# Patient Record
Sex: FEMALE | Employment: UNEMPLOYED | URBAN - NONMETROPOLITAN AREA
[De-identification: names, ages, dates, MRNs, and addresses within clinical notes are randomized per-mention and may not be internally consistent; named-entity substitution may affect disease eponyms.]

---

## 2022-08-25 ENCOUNTER — HOSPITAL ENCOUNTER (INPATIENT)
Age: 62
LOS: 2 days | Discharge: HOME | DRG: 287 | End: 2022-08-27
Attending: PEDIATRICS | Admitting: PEDIATRICS
Payer: COMMERCIAL

## 2022-08-25 PROBLEM — I21.4 NSTEMI (NON-ST ELEVATED MYOCARDIAL INFARCTION) (HCC): Status: ACTIVE | Noted: 2022-08-25

## 2022-08-25 PROCEDURE — 2140000000 HC CCU INTERMEDIATE R&B

## 2022-08-25 PROCEDURE — 99223 1ST HOSP IP/OBS HIGH 75: CPT | Performed by: PHYSICIAN ASSISTANT

## 2022-08-25 RX ORDER — PANTOPRAZOLE SODIUM 20 MG/1
20 TABLET, DELAYED RELEASE ORAL EVERY OTHER DAY
COMMUNITY

## 2022-08-25 RX ORDER — ALPRAZOLAM 0.5 MG/1
0.5 TABLET ORAL 3 TIMES DAILY
COMMUNITY

## 2022-08-25 RX ORDER — ESCITALOPRAM OXALATE 20 MG/1
20 TABLET ORAL DAILY
COMMUNITY

## 2022-08-25 NOTE — PROGRESS NOTES
Transfer  Report from [facility]  Referring Physician Dr Jw Shields  Accepting Physician Dr Sandoval Cordova  Patient Condition: 59 yo inpatient at [facility] with history of anxiety and GERD comes with complaints of chest pain that radiated to her neck, and SOB. The pain has resolved but today her Tropinin went from 0.09 to 1.070. She had EKG changes showing sinus bradycardia, incomplete RBBB, t-wave inversion in leads 2,3 and AVS and V4-V6. Labs creat 0.7, Hgb 12, all other labs within normal limits. Patient was given ASA, Lovenox, last dose at 1000 today, 60 mg. Vitals 97.7 60 18 123/66 97% on RA.     Accepted on behalf of Dr Sandoval Cordova to 7N47 with diagnosis of NSTEMI

## 2022-08-26 PROBLEM — R77.8 ELEVATED TROPONIN: Status: ACTIVE | Noted: 2022-08-26

## 2022-08-26 LAB
ANION GAP SERPL CALCULATED.3IONS-SCNC: 11 MEQ/L (ref 8–16)
ANION GAP SERPL CALCULATED.3IONS-SCNC: 13 MEQ/L (ref 8–16)
APTT: 34.7 SECONDS (ref 22–38)
APTT: 60.1 SECONDS (ref 22–38)
BUN BLDV-MCNC: 10 MG/DL (ref 7–22)
BUN BLDV-MCNC: 11 MG/DL (ref 7–22)
CALCIUM SERPL-MCNC: 9.3 MG/DL (ref 8.5–10.5)
CALCIUM SERPL-MCNC: 9.4 MG/DL (ref 8.5–10.5)
CHLORIDE BLD-SCNC: 109 MEQ/L (ref 98–111)
CHLORIDE BLD-SCNC: 110 MEQ/L (ref 98–111)
CHOLESTEROL, TOTAL: 128 MG/DL (ref 100–199)
CO2: 22 MEQ/L (ref 23–33)
CO2: 23 MEQ/L (ref 23–33)
CREAT SERPL-MCNC: 0.6 MG/DL (ref 0.4–1.2)
CREAT SERPL-MCNC: 0.6 MG/DL (ref 0.4–1.2)
EKG ATRIAL RATE: 58 BPM
EKG P AXIS: 49 DEGREES
EKG P-R INTERVAL: 158 MS
EKG Q-T INTERVAL: 466 MS
EKG QRS DURATION: 94 MS
EKG QTC CALCULATION (BAZETT): 457 MS
EKG R AXIS: -81 DEGREES
EKG T AXIS: -77 DEGREES
EKG VENTRICULAR RATE: 58 BPM
ERYTHROCYTE [DISTWIDTH] IN BLOOD BY AUTOMATED COUNT: 13 % (ref 11.5–14.5)
ERYTHROCYTE [DISTWIDTH] IN BLOOD BY AUTOMATED COUNT: 13.2 % (ref 11.5–14.5)
ERYTHROCYTE [DISTWIDTH] IN BLOOD BY AUTOMATED COUNT: 44.8 FL (ref 35–45)
ERYTHROCYTE [DISTWIDTH] IN BLOOD BY AUTOMATED COUNT: 46.2 FL (ref 35–45)
GFR SERPL CREATININE-BSD FRML MDRD: > 90 ML/MIN/1.73M2
GFR SERPL CREATININE-BSD FRML MDRD: > 90 ML/MIN/1.73M2
GLUCOSE BLD-MCNC: 95 MG/DL (ref 70–108)
GLUCOSE BLD-MCNC: 96 MG/DL (ref 70–108)
HCT VFR BLD CALC: 37 % (ref 37–47)
HCT VFR BLD CALC: 37.4 % (ref 37–47)
HDLC SERPL-MCNC: 56 MG/DL
HEMOGLOBIN: 12.2 GM/DL (ref 12–16)
HEMOGLOBIN: 12.6 GM/DL (ref 12–16)
INR BLD: 1.05 (ref 0.85–1.13)
LDL CHOLESTEROL CALCULATED: 59 MG/DL
LV EF: 58 %
LVEF MODALITY: NORMAL
MCH RBC QN AUTO: 31.4 PG (ref 26–33)
MCH RBC QN AUTO: 31.7 PG (ref 26–33)
MCHC RBC AUTO-ENTMCNC: 33 GM/DL (ref 32.2–35.5)
MCHC RBC AUTO-ENTMCNC: 33.7 GM/DL (ref 32.2–35.5)
MCV RBC AUTO: 94 FL (ref 81–99)
MCV RBC AUTO: 95.1 FL (ref 81–99)
PLATELET # BLD: 250 THOU/MM3 (ref 130–400)
PLATELET # BLD: 255 THOU/MM3 (ref 130–400)
PMV BLD AUTO: 10.6 FL (ref 9.4–12.4)
PMV BLD AUTO: 10.7 FL (ref 9.4–12.4)
POTASSIUM REFLEX MAGNESIUM: 5.1 MEQ/L (ref 3.5–5.2)
POTASSIUM SERPL-SCNC: 3.9 MEQ/L (ref 3.5–5.2)
RBC # BLD: 3.89 MILL/MM3 (ref 4.2–5.4)
RBC # BLD: 3.98 MILL/MM3 (ref 4.2–5.4)
SODIUM BLD-SCNC: 143 MEQ/L (ref 135–145)
SODIUM BLD-SCNC: 145 MEQ/L (ref 135–145)
TRIGL SERPL-MCNC: 63 MG/DL (ref 0–199)
TROPONIN T: 0.03 NG/ML
TROPONIN T: < 0.01 NG/ML
WBC # BLD: 6.5 THOU/MM3 (ref 4.8–10.8)
WBC # BLD: 7.9 THOU/MM3 (ref 4.8–10.8)

## 2022-08-26 PROCEDURE — 93306 TTE W/DOPPLER COMPLETE: CPT

## 2022-08-26 PROCEDURE — 93010 ELECTROCARDIOGRAM REPORT: CPT | Performed by: INTERNAL MEDICINE

## 2022-08-26 PROCEDURE — 6360000002 HC RX W HCPCS

## 2022-08-26 PROCEDURE — 6370000000 HC RX 637 (ALT 250 FOR IP): Performed by: PHYSICIAN ASSISTANT

## 2022-08-26 PROCEDURE — 85730 THROMBOPLASTIN TIME PARTIAL: CPT

## 2022-08-26 PROCEDURE — 99223 1ST HOSP IP/OBS HIGH 75: CPT | Performed by: INTERNAL MEDICINE

## 2022-08-26 PROCEDURE — 2140000000 HC CCU INTERMEDIATE R&B

## 2022-08-26 PROCEDURE — 99232 SBSQ HOSP IP/OBS MODERATE 35: CPT | Performed by: HOSPITALIST

## 2022-08-26 PROCEDURE — B2111ZZ FLUOROSCOPY OF MULTIPLE CORONARY ARTERIES USING LOW OSMOLAR CONTRAST: ICD-10-PCS | Performed by: INTERNAL MEDICINE

## 2022-08-26 PROCEDURE — 2580000003 HC RX 258: Performed by: PHYSICIAN ASSISTANT

## 2022-08-26 PROCEDURE — 85610 PROTHROMBIN TIME: CPT

## 2022-08-26 PROCEDURE — 93458 L HRT ARTERY/VENTRICLE ANGIO: CPT

## 2022-08-26 PROCEDURE — 6360000004 HC RX CONTRAST MEDICATION: Performed by: INTERNAL MEDICINE

## 2022-08-26 PROCEDURE — 84484 ASSAY OF TROPONIN QUANT: CPT

## 2022-08-26 PROCEDURE — 80061 LIPID PANEL: CPT

## 2022-08-26 PROCEDURE — 2500000003 HC RX 250 WO HCPCS

## 2022-08-26 PROCEDURE — 2580000003 HC RX 258: Performed by: INTERNAL MEDICINE

## 2022-08-26 PROCEDURE — 6360000002 HC RX W HCPCS: Performed by: PHYSICIAN ASSISTANT

## 2022-08-26 PROCEDURE — 4A023N7 MEASUREMENT OF CARDIAC SAMPLING AND PRESSURE, LEFT HEART, PERCUTANEOUS APPROACH: ICD-10-PCS | Performed by: INTERNAL MEDICINE

## 2022-08-26 PROCEDURE — 93005 ELECTROCARDIOGRAM TRACING: CPT | Performed by: INTERNAL MEDICINE

## 2022-08-26 PROCEDURE — 85027 COMPLETE CBC AUTOMATED: CPT

## 2022-08-26 PROCEDURE — 93458 L HRT ARTERY/VENTRICLE ANGIO: CPT | Performed by: INTERNAL MEDICINE

## 2022-08-26 PROCEDURE — 80048 BASIC METABOLIC PNL TOTAL CA: CPT

## 2022-08-26 PROCEDURE — 36415 COLL VENOUS BLD VENIPUNCTURE: CPT

## 2022-08-26 RX ORDER — MAGNESIUM SULFATE IN WATER 40 MG/ML
2000 INJECTION, SOLUTION INTRAVENOUS PRN
Status: DISCONTINUED | OUTPATIENT
Start: 2022-08-26 | End: 2022-08-27 | Stop reason: HOSPADM

## 2022-08-26 RX ORDER — HEPARIN SODIUM 10000 [USP'U]/100ML
5-30 INJECTION, SOLUTION INTRAVENOUS CONTINUOUS
Status: DISCONTINUED | OUTPATIENT
Start: 2022-08-26 | End: 2022-08-26

## 2022-08-26 RX ORDER — SODIUM CHLORIDE 9 MG/ML
INJECTION, SOLUTION INTRAVENOUS PRN
Status: DISCONTINUED | OUTPATIENT
Start: 2022-08-26 | End: 2022-08-27 | Stop reason: HOSPADM

## 2022-08-26 RX ORDER — ONDANSETRON 4 MG/1
4 TABLET, ORALLY DISINTEGRATING ORAL EVERY 8 HOURS PRN
Status: DISCONTINUED | OUTPATIENT
Start: 2022-08-26 | End: 2022-08-27 | Stop reason: HOSPADM

## 2022-08-26 RX ORDER — SODIUM CHLORIDE 0.9 % (FLUSH) 0.9 %
5-40 SYRINGE (ML) INJECTION PRN
Status: DISCONTINUED | OUTPATIENT
Start: 2022-08-26 | End: 2022-08-26 | Stop reason: SDUPTHER

## 2022-08-26 RX ORDER — POTASSIUM CHLORIDE 7.45 MG/ML
10 INJECTION INTRAVENOUS PRN
Status: DISCONTINUED | OUTPATIENT
Start: 2022-08-26 | End: 2022-08-27 | Stop reason: HOSPADM

## 2022-08-26 RX ORDER — PANTOPRAZOLE SODIUM 40 MG/1
40 TABLET, DELAYED RELEASE ORAL
Status: DISCONTINUED | OUTPATIENT
Start: 2022-08-26 | End: 2022-08-27 | Stop reason: HOSPADM

## 2022-08-26 RX ORDER — ACETAMINOPHEN 325 MG/1
650 TABLET ORAL EVERY 4 HOURS PRN
Status: DISCONTINUED | OUTPATIENT
Start: 2022-08-26 | End: 2022-08-27 | Stop reason: HOSPADM

## 2022-08-26 RX ORDER — ESCITALOPRAM OXALATE 20 MG/1
20 TABLET ORAL DAILY
Status: DISCONTINUED | OUTPATIENT
Start: 2022-08-26 | End: 2022-08-27 | Stop reason: HOSPADM

## 2022-08-26 RX ORDER — ALPRAZOLAM 0.5 MG/1
0.5 TABLET ORAL 3 TIMES DAILY PRN
Status: DISCONTINUED | OUTPATIENT
Start: 2022-08-26 | End: 2022-08-27 | Stop reason: HOSPADM

## 2022-08-26 RX ORDER — MORPHINE SULFATE 2 MG/ML
2 INJECTION, SOLUTION INTRAMUSCULAR; INTRAVENOUS EVERY 4 HOURS PRN
Status: DISCONTINUED | OUTPATIENT
Start: 2022-08-26 | End: 2022-08-27 | Stop reason: HOSPADM

## 2022-08-26 RX ORDER — HEPARIN SODIUM 1000 [USP'U]/ML
30 INJECTION, SOLUTION INTRAVENOUS; SUBCUTANEOUS PRN
Status: DISCONTINUED | OUTPATIENT
Start: 2022-08-26 | End: 2022-08-26 | Stop reason: ALTCHOICE

## 2022-08-26 RX ORDER — POLYETHYLENE GLYCOL 3350 17 G/17G
17 POWDER, FOR SOLUTION ORAL DAILY PRN
Status: DISCONTINUED | OUTPATIENT
Start: 2022-08-26 | End: 2022-08-27 | Stop reason: HOSPADM

## 2022-08-26 RX ORDER — SODIUM CHLORIDE 0.9 % (FLUSH) 0.9 %
5-40 SYRINGE (ML) INJECTION EVERY 12 HOURS SCHEDULED
Status: DISCONTINUED | OUTPATIENT
Start: 2022-08-26 | End: 2022-08-26 | Stop reason: SDUPTHER

## 2022-08-26 RX ORDER — ACETAMINOPHEN 325 MG/1
650 TABLET ORAL EVERY 6 HOURS PRN
Status: DISCONTINUED | OUTPATIENT
Start: 2022-08-26 | End: 2022-08-26 | Stop reason: SDUPTHER

## 2022-08-26 RX ORDER — ONDANSETRON 2 MG/ML
4 INJECTION INTRAMUSCULAR; INTRAVENOUS EVERY 6 HOURS PRN
Status: DISCONTINUED | OUTPATIENT
Start: 2022-08-26 | End: 2022-08-27 | Stop reason: HOSPADM

## 2022-08-26 RX ORDER — ASPIRIN 81 MG/1
81 TABLET, CHEWABLE ORAL DAILY
Status: DISCONTINUED | OUTPATIENT
Start: 2022-08-27 | End: 2022-08-27 | Stop reason: HOSPADM

## 2022-08-26 RX ORDER — HEPARIN SODIUM 1000 [USP'U]/ML
60 INJECTION, SOLUTION INTRAVENOUS; SUBCUTANEOUS PRN
Status: DISCONTINUED | OUTPATIENT
Start: 2022-08-26 | End: 2022-08-26 | Stop reason: ALTCHOICE

## 2022-08-26 RX ORDER — SODIUM CHLORIDE 9 MG/ML
INJECTION, SOLUTION INTRAVENOUS CONTINUOUS
Status: DISCONTINUED | OUTPATIENT
Start: 2022-08-26 | End: 2022-08-27 | Stop reason: HOSPADM

## 2022-08-26 RX ORDER — ATORVASTATIN CALCIUM 80 MG/1
80 TABLET, FILM COATED ORAL NIGHTLY
Status: DISCONTINUED | OUTPATIENT
Start: 2022-08-26 | End: 2022-08-27 | Stop reason: HOSPADM

## 2022-08-26 RX ORDER — HYDRALAZINE HYDROCHLORIDE 20 MG/ML
10 INJECTION INTRAMUSCULAR; INTRAVENOUS EVERY 6 HOURS PRN
Status: DISCONTINUED | OUTPATIENT
Start: 2022-08-26 | End: 2022-08-27 | Stop reason: HOSPADM

## 2022-08-26 RX ORDER — POTASSIUM CHLORIDE 20 MEQ/1
40 TABLET, EXTENDED RELEASE ORAL PRN
Status: DISCONTINUED | OUTPATIENT
Start: 2022-08-26 | End: 2022-08-27 | Stop reason: HOSPADM

## 2022-08-26 RX ORDER — ACETAMINOPHEN 650 MG/1
650 SUPPOSITORY RECTAL EVERY 6 HOURS PRN
Status: DISCONTINUED | OUTPATIENT
Start: 2022-08-26 | End: 2022-08-27 | Stop reason: HOSPADM

## 2022-08-26 RX ORDER — MORPHINE SULFATE 4 MG/ML
4 INJECTION, SOLUTION INTRAMUSCULAR; INTRAVENOUS EVERY 4 HOURS PRN
Status: DISCONTINUED | OUTPATIENT
Start: 2022-08-26 | End: 2022-08-27 | Stop reason: HOSPADM

## 2022-08-26 RX ORDER — SODIUM CHLORIDE 0.9 % (FLUSH) 0.9 %
5-40 SYRINGE (ML) INJECTION EVERY 12 HOURS SCHEDULED
Status: DISCONTINUED | OUTPATIENT
Start: 2022-08-26 | End: 2022-08-27 | Stop reason: HOSPADM

## 2022-08-26 RX ORDER — SODIUM CHLORIDE 0.9 % (FLUSH) 0.9 %
5-40 SYRINGE (ML) INJECTION PRN
Status: DISCONTINUED | OUTPATIENT
Start: 2022-08-26 | End: 2022-08-27 | Stop reason: HOSPADM

## 2022-08-26 RX ADMIN — ALPRAZOLAM 0.5 MG: 0.5 TABLET ORAL at 10:34

## 2022-08-26 RX ADMIN — IOPAMIDOL 70 ML: 755 INJECTION, SOLUTION INTRAVENOUS at 16:31

## 2022-08-26 RX ADMIN — PANTOPRAZOLE SODIUM 40 MG: 40 TABLET, DELAYED RELEASE ORAL at 07:48

## 2022-08-26 RX ADMIN — HEPARIN SODIUM AND DEXTROSE 12 UNITS/KG/HR: 10000; 5 INJECTION INTRAVENOUS at 05:24

## 2022-08-26 RX ADMIN — ACETAMINOPHEN 650 MG: 325 TABLET ORAL at 08:52

## 2022-08-26 RX ADMIN — ESCITALOPRAM 20 MG: 20 TABLET, FILM COATED ORAL at 10:01

## 2022-08-26 RX ADMIN — SODIUM CHLORIDE: 9 INJECTION, SOLUTION INTRAVENOUS at 05:22

## 2022-08-26 RX ADMIN — SODIUM CHLORIDE, PRESERVATIVE FREE 10 ML: 5 INJECTION INTRAVENOUS at 21:22

## 2022-08-26 RX ADMIN — SODIUM CHLORIDE: 9 INJECTION, SOLUTION INTRAVENOUS at 16:45

## 2022-08-26 ASSESSMENT — ENCOUNTER SYMPTOMS
NAUSEA: 1
SHORTNESS OF BREATH: 1
ALLERGIC/IMMUNOLOGIC NEGATIVE: 1
BACK PAIN: 1
EYES NEGATIVE: 1
VOMITING: 0

## 2022-08-26 ASSESSMENT — PAIN SCALES - GENERAL
PAINLEVEL_OUTOF10: 4
PAINLEVEL_OUTOF10: 0

## 2022-08-26 ASSESSMENT — PAIN DESCRIPTION - LOCATION: LOCATION: HEAD

## 2022-08-26 NOTE — PROGRESS NOTES
Hospitalist Progress Note    Patient:  Ratna Hanna      Unit/Bed:3B-36/036-A    YOB: 1960    MRN: [id number]       Acct: [de-identified]     PCP: Nai Jules    Date of Admission: 8/25/2022    Assessment/Plan:    NSTEMI: EKG changes and elevated troponin. Appreciate cardiology input. Continue with aspirin, statin. Patient is on heparin drip and plan for left heart cath today. 2D echo reviewed reveals normal EF and LV wall motion. Depression: Continue with Lexapro  GERD: Continue Protonix 40 mg daily      Subjective (past 24 hours):   Patient seen and examined at bedside. Denies any chest pain, nausea or vomiting, dizziness at this time. Patient is tearful willing to proceed with cardiac catheterization. No acute overnight events.       Medications:  Reviewed    Infusion Medications    sodium chloride      sodium chloride 50 mL/hr at 08/26/22 0522    heparin (PORCINE) Infusion 12 Units/kg/hr (08/26/22 0524)    sodium chloride       Scheduled Medications    escitalopram  20 mg Oral Daily    pantoprazole  40 mg Oral QAM AC    sodium chloride flush  5-40 mL IntraVENous 2 times per day    [START ON 8/27/2022] aspirin  81 mg Oral Daily    atorvastatin  80 mg Oral Nightly    sodium chloride flush  5-40 mL IntraVENous 2 times per day     PRN Meds: ALPRAZolam, sodium chloride flush, sodium chloride, ondansetron **OR** ondansetron, acetaminophen **OR** acetaminophen, polyethylene glycol, perflutren lipid microspheres, potassium chloride **OR** potassium alternative oral replacement **OR** potassium chloride, magnesium sulfate, morphine **OR** morphine, heparin (porcine), heparin (porcine), sodium chloride flush, sodium chloride    No intake or output data in the 24 hours ending 08/26/22 1340    Diet:  Diet NPO Exceptions are: Sips of Water with Meds    Exam:  /69   Pulse 54   Temp 97.8 °F (36.6 °C) (Oral)   Resp 18   Ht 5' 2\" (1.575 m)   Wt 138 lb 12.8 oz (63 kg)   SpO2 96%   BMI 25.39 kg/m²     General appearance: No apparent distress, appears stated age and cooperative. Respiratory:  Normal respiratory effort. Clear to auscultation, bilaterally without Rales/Wheezes/Rhonchi. Cardiovascular: Regular rate and rhythm with normal S1/S2 without murmurs, rubs or gallops. Abdomen: Soft, non-tender, non-distended with normal bowel sounds. Extremities: no pedal edema  Skin:  no rashes    Labs:   Recent Labs     08/26/22  0644 08/26/22  1052   WBC 6.5 7.9   HGB 12.2 12.6   HCT 37.0 37.4    255     Recent Labs     08/26/22  1052      K 3.9      CO2 23   BUN 10   CREATININE 0.6   CALCIUM 9.3     No results for input(s): AST, ALT, BILIDIR, BILITOT, ALKPHOS in the last 72 hours. Recent Labs     08/26/22  0218   INR 1.05     No results for input(s): Re Horn in the last 72 hours. No results for input(s): PROCAL in the last 72 hours. Microbiology:      Urinalysis:    No results found for: Swanson Joel, BACTERIA, RBCUA, Anamaria Kipper, [address]    Radiology:  Echocardiogram 2D/ M-Mode/ Colorflow/ Do    Result Date: 8/26/2022  Transthoracic Echocardiography Report (TTE)  Demographics   Patient Name    [place] Gender               Female   MR #            [id number]     Race                 Other                                 Ethnicity             or    Account #       [de-identified]     Room Number          0036   Accession       [id number]    Date of Study        08/26/2022  Number   Date of Birth   1960    Referring Physician  Hardeep Moya PA-C   Age             58 year(s)    Katia Whittington MD                                Physician  Procedure Type of Study   TTE procedure:ECHOCARDIOGRAM COMPLETE 2D W DOPPLER W COLOR.   Procedure Date Date: 08/26/2022 Start: 08:24 AM Study Location: Bedside Technical Quality: Adequate visualization Indications:NSTEMI. Additional Medical History:Smoker, GERD Patient Status: Routine Height: 62.2 inches Weight: 138.89 pounds BSA: 1.64 m^2 BMI: 25.24 kg/m^2 BP: 129/72 mmHg  Conclusions   Summary  Normal left ventricle size and systolic function. Ejection fraction was  estimated at 55-60%. There were no regional left ventricular wall motion  abnormalities and wall thickness was within normal limits. Signature   ----------------------------------------------------------------  Electronically signed by Manolo Uriarte MD (Interpreting  physician) on 08/26/2022 at 12:23 PM  ----------------------------------------------------------------   Findings   Mitral Valve  The mitral valve structure was normal with normal leaflet separation. DOPPLER: The transmitral velocity was within the normal range with no  evidence for mitral stenosis. There was evidence of Trace mitral  regurgitation. Aortic Valve  The aortic valve was trileaflet with normal thickness and cuspal  separation. DOPPLER: Transaortic velocity was within the normal range with  no evidence of aortic stenosis. There was no evidence of aortic  regurgitation. Tricuspid Valve  The tricuspid valve structure was normal with normal leaflet separation. DOPPLER: There was no evidence of tricuspid stenosis. There was evidence  of Trace tricuspid regurgitation. Pulmonic Valve  The pulmonic valve leaflets exhibited normal thickness, no calcification,  and normal cuspal separation. DOPPLER: The transpulmonic velocity was  within the normal range with no evidence for regurgitation. Left Atrium  Left atrial size was normal.   Left Ventricle  Normal left ventricle size and systolic function. Ejection fraction was  estimated at 55-60%. There were no regional left ventricular wall motion  abnormalities and wall thickness was within normal limits.    Right Atrium  Right atrial size was normal.   Right Ventricle  The right ventricular size was normal with normal systolic function and  wall thickness. Pericardial Effusion  The pericardium was normal in appearance with no evidence of a pericardial  effusion. Pleural Effusion  No evidence of pleural effusion. Aorta / Great Vessels  -Aortic root dimension within normal limits.  -The Pulmonary artery is within normal limits. -IVC size is within normal limits with normal respiratory phasic changes.   M-Mode/2D Measurements & Calculations   LV Diastolic   LV Systolic Dimension:    AV Cusp Separation: 1.9 cmLA  Dimension: 4.7 3.4 cm                    Dimension: 3.4 cmAO Root  cm             LV Volume Diastolic: 74.9 Dimension: 2.7 cmLA Area: 14.1  LV FS:27.7 %   ml                        cm^2  LV PW          LV Volume Systolic: 29.4  Diastolic: 0.9 ml  cm             LV EDV/LV EDV Index: 74.8  Septum         ml/46 m^2LV ESV/LV ESV    RV Diastolic Dimension: 2.5 cm  Diastolic: 0.9 Index: 15.0 ml/22 m^2  cm             EF Calculated: 52.8 %     LA/Aorta: 1.26                                           Ascending Aorta: 2.9 cm                 LV Length: 7.5 cm         LA volume/Index: 32.1 ml /20m^2   LV Area  Diastolic: 26  cm^2  LV Area  Systolic: 82.8  cm^2  Doppler Measurements & Calculations   MV Peak E-Wave: 72.8 AV Peak Velocity: 126 LVOT Peak Velocity: 99.9 cm/s  cm/s                 cm/s                  LVOT Mean Velocity: 66.5 cm/s  MV Peak A-Wave: 58.7 AV Peak Gradient:     LVOT Peak Gradient: 4 mmHgLVOT  cm/s                 6.35 mmHg             Mean Gradient: 2 mmHg  MV E/A Ratio: 1.24   AV Mean Velocity: 90  MV Peak Gradient:    cm/s                  TV Peak E-Wave: 55.7 cm/s  2.12 mmHg            AV Mean Gradient: 4   TV Peak A-Wave: 42.4 cm/s                       mmHg  MV Deceleration      AV VTI: 30 cm         TV Peak Gradient: 1.24 mmHg  Time: 239 msec                             TR Velocity:226 cm/s  MV P1/2t: 70

## 2022-08-26 NOTE — BRIEF OP NOTE
[address] [address]  Sedation/Analgesia Post Sedation Record        Pt Name: Yolis Carpenter  MRN: [id number]  YOB: 1960  Procedure Performed By: Foreign Henry MD MD, [address]  Primary Care Physician: Loco Dyson    POST-PROCEDURE    Sedation/Anesthesia:  Local Anesthesia and IV Conscious Sedation with continuous O2 monitoring    Estimated Blood Loss: 10 cc     Specimens Removed:  [x]None []Other:      Disposition of Specimen:  []Pathology []Other        Complications:   [x]None Immediate []Other:       Procedure performed: Left heart cath    Post Procedure Diagnosis/Findings:  Patent coronaries  Recommendations:    Medical treatment  Routine post-cath care.                Foreign Henry MD MD, [address]  Electronically signed 8/26/2022 at 4:34 PM

## 2022-08-26 NOTE — PROCEDURES
[address] [id number]                            CARDIAC CATHETERIZATION    PATIENT NAME: Ollie Hathaway                      :        1960  MED REC NO:   [id number]                           ROOM:       0036  ACCOUNT NO:   [de-identified]                           ADMIT DATE: 2022  PROVIDER:     Christine Khan MD    DATE OF PROCEDURE:  2022    PROCEDURE PERFORMED:  Coronary angiogram.    INDICATION FOR STUDY:  Elevated troponins. DESCRIPTION OF PROCEDURE:  After written informed consent was obtained,  the patient was brought to the cardiac catheterization laboratory in a  fasting, nonsedated state. Preprocedure timeout was performed. 2%  lidocaine was used to anesthetize the subcutaneous tissue overlying the  right radial artery. Once the sheath was in place, a radial cocktail  was given. EQUIPMENTS USED:  Standard 5-Kazakh JR4, JL3.5 diagnostic catheters. ESTIMATED BLOOD LOSS:  Less than 20 mL. CORONARY ANGIOGRAM:  1. Right coronary artery is dominant vessel. There is no significant  disease in the proximal, mid and distal portions of the vessel. Distally, there is a large-sized PDA and PL branches, both of which are  patent. 2.  Left main is patent, gives rise to LAD and left circumflex arteries. 3.  Left circumflex artery is patent with no significant disease in the  proximal, mid and distal portions of the vessel. 4. LAD is patent in the proximal portion, mid and distal portions have  mild luminal irregularities. LVEDP was measured to be 3 mmHg. There is no significant gradient  across the aortic valve. LV systolic function was estimated at 60%. Hemostasis of the right radial artery was achieved with a Vasc Band  device. There were no immediate complaints and she was transferred to  her room in stable condition. SUMMARY:  1. Patent coronary arteries.   2.  LVEDP was measured to be 3 mmHg.  3.  LV systolic function was estimated at 60%. RECOMMENDATIONS:  1. Optimize medical therapy. 2.  Smoking cessation strongly advised. 3.  IV fluids. 4.  Monitor radial artery access site. All procedure details and management plans were discussed in detail with  the patient and family members, and they were in agreement with the  plan.         Mary Ann Brandt MD    D: 08/26/2022 16:49:33       T: 08/26/2022 17:44:11     JUAN [email or web address]  Job#: [id number]     Doc#: [id number]    CC:

## 2022-08-26 NOTE — CARE COORDINATION
8/26/22, 8:10 AM EDT  DISCHARGE PLANNING EVALUATION:    Mary Siu       Admitted: 8/25/2022/ 2120   Hospital day: 1   Location: [address] [place] [address]-A Reason for admit: NSTEMI (non-ST elevated myocardial infarction) ([facility] [address] [I21.4]   PMH:  has a past medical history of GERD (gastroesophageal reflux disease), History of blood transfusion, and Psychiatric problem. Procedure:   8/26 Echo ordered  Barriers to Discharge:  Transfer from [facility] for + troponin, EKG changes. Admitted for NSTEMI. Hospitalist following. Cardio consult. IVF. Heparin gtt. ASA. Lipitor. Telemetry. Daily wts. I/O.   PCP: Karla Henderson  Readmission Risk Score: 4.7%  Patient's Healthcare Decision Maker: Patient Declined (Legal Next of Kin Remains as Decision Maker)-has ACPs at home    Patient Goals/Plan/Treatment Preferences: Spoke with Kyara Kaba, she plans to return home with her daughter and DIL. She is independent and  does not use any dme/O2. Denies needs. Transportation/Food Security/Housekeeping Addressed:  No issues identified.

## 2022-08-26 NOTE — PROGRESS NOTES
[facility]  Sedation/Analgesia History & Physical    Pt Name: Theresa Oviedo  Account number: [de-identified]  MRN: [id number]  YOB: 1960  Provider Performing Procedure: Pamella Bonilla MD MD [facility]  Primary Care Physician: Torey Leahy  Date: 8/26/2022    PRE-PROCEDURE    Code Status: FULL CODE  Brief History/Pre-Procedure Diagnosis: ? NSTEMI     Consent: : I have discussed with the patient risks, benefits, and alternatives (and relevant risks, benefits, and side effects related to alternatives or not receiving care), and likelihood of the success. The patient and/or representative appear to understand and agree to proceed. The discussion encompasses risks, benefits, and side effects related to the alternatives and the risks related to not receiving the proposed care, treatment, and services. PLANNED PROCEDURE   [x]Cath  [x]PCI                []Pacemaker/AICD  []EDWARD             []Cardioversion []Peripheral angiography/PTA  []Other:      VITAL SIGNS   Vitals:    08/26/22 1100   BP: 127/69   Pulse: 54   Resp: 18   Temp: 97.8 °F (36.6 °C)   SpO2: 96%       PHYSICAL:   General: No acute distress  HEENT:  Unremarkable for age  Neck: without increased JVD, carotid pulses 2+ bilaterally without bruits  Heart: RRR, S1 & S2 WNL   Lungs: Clear to auscultation    Abdomen: BS present, without HSM, masses, or tenderness    Extremities: without C,C,E.  Pulses 2+ bilaterally  Mental Status: Alert & Oriented    SEDATION  Planned agent:[x]Midazolam []Meperidine []Sublimaze []Morphine  []Diazepam  [x]Other: fentanyl      ASA Classification:  []1 []2 [x]3 []4 []5  Class 1: A normal healthy patient  Class 2: Pt with mild to moderate systemic disease  Class 3: Severe systemic disease or disturbance  Class 4: Severe systemic disorders that are already life threatening. Class 5: Moribund pt with little chances of survival, for more than 24 hours.   Mallampati I Airway Classification:   []1 []2 [x]3 08/26/22 0522    potassium chloride (KLOR-CON M) extended release tablet 40 mEq, 40 mEq, Oral, PRN **OR** potassium bicarb-citric acid (EFFER-K) effervescent tablet 40 mEq, 40 mEq, Oral, PRN **OR** potassium chloride 10 mEq/100 mL IVPB (Peripheral Line), 10 mEq, IntraVENous, PRN, [employer], PA-C    magnesium sulfate 2000 mg in 50 mL IVPB premix, 2,000 mg, IntraVENous, PRN, [employer], PA-C    morphine (PF) injection 2 mg, 2 mg, IntraVENous, Q4H PRN **OR** morphine injection 4 mg, 4 mg, IntraVENous, Q4H PRN, [employer], PA-C    heparin (porcine) injection 3,780 Units, 60 Units/kg, IntraVENous, PRN, [employer], PA-C    heparin (porcine) injection 1,890 Units, 30 Units/kg, IntraVENous, PRN, [employer], PA-C    heparin 25,000 units in dextrose 5% 250 mL (premix) infusion, 5-30 Units/kg/hr, IntraVENous, Continuous, [employer], PA-C, Last Rate: 7.6 mL/hr at 08/26/22 0524, 12 Units/kg/hr at 08/26/22 0524    sodium chloride flush 0.9 % injection 5-40 mL, 5-40 mL, IntraVENous, 2 times per day, Kristi Michael MD    sodium chloride flush 0.9 % injection 5-40 mL, 5-40 mL, IntraVENous, PRN, Kristi Michael MD    0.9 % sodium chloride infusion, , IntraVENous, PRN, Betina Dillon MD  Prior to Admission medications    Medication Sig Start Date End Date Taking? Authorizing Provider   escitalopram (LEXAPRO) 20 MG tablet Take 20 mg by mouth daily   Yes Historical Provider, MD   ALPRAZolam (XANAX) 0.5 MG tablet Take 0.5 mg by mouth 3 times daily.    Yes Historical Provider, MD   pantoprazole (PROTONIX) 20 MG tablet Take 20 mg by mouth every other day   Yes Historical Provider, MD     Additional information:                 Camelia Reich MD MD [facility]  Electronically signed 8/26/2022 at 3:52 PM

## 2022-08-26 NOTE — CONSULTS
[facility]    Patient's Name/Date of Birth: Matthew Razo / 1960 (33 y.o.)    Date: August 26, 2022     Referring Provider: Lyn Kyle MD    CHIEF COMPLAINT: Chest pain/difficulty breathing  Reason for consult: NSTEMI      HPI: This is a pleasant 58 y.o. female with a past medical history of anxiety/depression, GERD, current smoker who presented to [facility] Wednesday night with complaints of chest pressure and difficulty breathing and transferred to [facility] last night for cardiology consult and possible intervention for NSTEMI. The patient reports that Wednesday after work she was feeling \"tired and flustered\" when she got home and began feeling really rapid breathing and a pressure in the center of her chest without radiation, rated 1-2/10. She says the chest pressure lasted about 30 mins and went away on its own and she hasn't experienced it since then. They gave her nitro in the ED but the chest pressure was already gone. The rapid breathing resolved within an hour of arriving to [facility] ED. She endorses  nausea, HA, dizziness, diaphoresis and tenseness in her jawline as well as arm heaviness associated with the chest pain. She said her daughter helped calm her down. She says there's a lot going on right now in her life: her  passed away last year and her brother has cancer. She  has been smoking a lot lately. She says right now she just has a slight headache but denies chest pain, SOB, leg swelling, n/v. She says her father has a history of heart disease requiring bypass surgery and CVA. Per ED note pain has resolved but at [facility] ED Tropinin went from 0.09 to 1.070 and she had EKG changes showing sinus bradycardia, incomplete RBBB, t-wave inversion in leads 2,3 and AVS and V4-V6. Labs creat 0.7, Hgb 12, all other labs within normal limits. Patient was given ASA, Lovenox, last dose at 1000 08/25/22, 60 mg. Vitals 97.7 60 18 123/66 97% on RA. Troponin on arrival to [facility] 0.029, repeat today <0.010. ECG since admission showedd peaked T waves in anterior leads and T wave inversions in II, III and aVF. Heart score 7. Echo: No results found for this or any previous visit.        All labs, EKG's, diagnostic testing and images as well as cardiac cath, stress testing were reviewed during this encounter    Past Medical History:   Diagnosis Date    GERD (gastroesophageal reflux disease)     History of blood transfusion     Psychiatric problem      Past Surgical History:   Procedure Laterality Date    CHOLECYSTECTOMY      JOINT REPLACEMENT       Current Facility-Administered Medications   Medication Dose Route Frequency Provider Last Rate Last Admin    ALPRAZolam [facility]) tablet 0.5 mg  0.5 mg Oral TID PRN Gabbie Nevarez PA-C        escitalopram (LEXAPRO) tablet 20 mg  20 mg Oral Daily Gabbie Nevarez PA-C        pantoprazole (PROTONIX) tablet 40 mg  40 mg Oral QAM AC Luh Jen Cabrera PA-C   40 mg at 08/26/22 0748    sodium chloride flush 0.9 % injection 5-40 mL  5-40 mL IntraVENous 2 times per day Gabbie Nevarez PA-C        sodium chloride flush 0.9 % injection 5-40 mL  5-40 mL IntraVENous PRN Luh Moran PA-C        0.9 % sodium chloride infusion   IntraVENous PRN Luh Moran PA-C        ondansetron (ZOFRAN-ODT) disintegrating tablet 4 mg  4 mg Oral Q8H PRN Luh Felder PA-C        Or    ondansetron (ZOFRAN) injection 4 mg  4 mg IntraVENous Q6H PRN Luh Moran PA-C        acetaminophen (TYLENOL) tablet 650 mg  650 mg Oral Q6H PRN Gabbie Nevarez PA-C        Or    acetaminophen (TYLENOL) suppository 650 mg  650 mg Rectal Q6H PRN Luh Moran PA-C        polyethylene glycol (GLYCOLAX) packet 17 g  17 g Oral Daily PRN Gabbie Nevarez PA-C        [START ON 8/27/2022] aspirin chewable tablet 81 mg  81 mg Oral Daily Luh Moran PA-C        atorvastatin (LIPITOR) tablet 80 mg  80 mg Oral Nightly Luh ISABELA Moran PA-C        perflutren lipid microspheres (DEFINITY) injection 1.65 mg  1.5 mL IntraVENous ONCE PRN Rutha Dapper, PA-C        0.9 % sodium chloride infusion   IntraVENous Continuous Rutha Dapper, PA-C 50 mL/hr at 08/26/22 0522 New Bag at 08/26/22 0522    potassium chloride (KLOR-CON M) extended release tablet 40 mEq  40 mEq Oral PRN Rutha Dapper, PA-C        Or    potassium bicarb-citric acid (EFFER-K) effervescent tablet 40 mEq  40 mEq Oral PRN Rutha Dapper, PA-C        Or    potassium chloride 10 mEq/100 mL IVPB (Peripheral Line)  10 mEq IntraVENous PRN Luh R Jolene Dixon, PA-C        magnesium sulfate 2000 mg in 50 mL IVPB premix  2,000 mg IntraVENous PRN Rutha Dapper, PA-C        morphine (PF) injection 2 mg  2 mg IntraVENous Q4H PRN Rutha Dapper, PA-C        Or    morphine injection 4 mg  4 mg IntraVENous Q4H PRN Luh R Brown, PA-C        heparin (porcine) injection 3,780 Units  60 Units/kg IntraVENous PRN Luh R Jolene Dixon, PA-C        heparin (porcine) injection 1,890 Units  30 Units/kg IntraVENous PRN Luh R Brown, PA-C        heparin 25,000 units in dextrose 5% 250 mL (premix) infusion  5-30 Units/kg/hr IntraVENous Continuous Rutha Dapper, PA-C 7.6 mL/hr at 08/26/22 0524 12 Units/kg/hr at 08/26/22 0524     Prior to Admission medications    Medication Sig Start Date End Date Taking? Authorizing Provider   escitalopram (LEXAPRO) 20 MG tablet Take 20 mg by mouth daily   Yes Historical Provider, MD   ALPRAZolam (XANAX) 0.5 MG tablet Take 0.5 mg by mouth 3 times daily.    Yes Historical Provider, MD   pantoprazole (PROTONIX) 20 MG tablet Take 20 mg by mouth every other day   Yes Historical Provider, MD   Scheduled Meds:   escitalopram  20 mg Oral Daily    pantoprazole  40 mg Oral QAM AC    sodium chloride flush  5-40 mL IntraVENous 2 times per day    [START ON 8/27/2022] aspirin  81 mg Oral Daily    atorvastatin  80 mg Oral Nightly     Continuous Infusions:   sodium chloride      sodium chloride 50 mL/hr at 08/26/22 0522    heparin (PORCINE) Infusion 12 Units/kg/hr (08/26/22 0543)     PRN Meds:. ALPRAZolam, sodium chloride flush, sodium chloride, ondansetron **OR** ondansetron, acetaminophen **OR** acetaminophen, polyethylene glycol, perflutren lipid microspheres, potassium chloride **OR** potassium alternative oral replacement **OR** potassium chloride, magnesium sulfate, morphine **OR** morphine, heparin (porcine), heparin (porcine)    No Known Allergies  Family History   Problem Relation Age of Onset    Coronary Art Dis Father     Cancer Brother      Social History     Socioeconomic History    Marital status:      Spouse name: Not on file    Number of children: 2    Years of education: Not on file    Highest education level: Not on file   Occupational History    Occupation: light factory   Tobacco Use    Smoking status: Every Day     Packs/day: 0.50     Years: 1.00     Pack years: 0.50     Types: Cigarettes    Smokeless tobacco: Never   Vaping Use    Vaping Use: Never used   Substance and Sexual Activity    Alcohol use: Not Currently     Comment: rare    Drug use: Yes     Types: Marijuana Whitaker Xiao)    Sexual activity: Not on file   Other Topics Concern    Not on file   Social History Narrative    Not on file     Social Determinants of Health     Financial Resource Strain: Not on file   Food Insecurity: Not on file   Transportation Needs: Not on file   Physical Activity: Not on file   Stress: Not on file   Social Connections: Not on file   Intimate Partner Violence: Not on file   Housing Stability: Not on file     ROS:   Constitutional: Denies any recent wt change. Eyes:  Denies any blurring or double vision, no glaucoma  Ears/Nose/Mouth/Throat:  Denies any chronic sinus/rhinitis, bleeding gums  Cardiovascular:  As described above.     Respiratory:  Denies any frequent cough, wheezing or coughing up blood  Genitourinary:  Denies difficulty with urination and kidney stones  Gastrointestinal:  Denies any chronic problems with abdominal pain, nausea, vomiting or diarrhea  Musculoskeletal:  Denies any joint pain, back pain, or difficulty walking  Integumentary:  Denies any rash  Neurological:  No numbness or tingling  Endocrine:  Denies any polydipsia. Hematologic/Lymphatic:  Denies any hemorrhage or lymphatic drainage problems. Labs:  CBC:   Recent Labs     08/26/22  0644   WBC 6.5   HGB 12.2   HCT 37.0   MCV 95.1        BMP: No results for input(s): NA, K, CL, CO2, PHOS, BUN, CREATININE, CA, MG in the last 72 hours. Accucheck Glucoses: No results for input(s): POCGLU in the last 72 hours. Cardiac Enzymes: No results for input(s): CKTOTAL, CKMB, CKMBINDEX, TROPONINI in the last 72 hours.   PT/INR:   Recent Labs     08/26/22  0218   INR 1.05     APTT:   Recent Labs     08/26/22 0218   APTT 34.7     Liver Profile:  No results found for: AST, ALT, ALB, BILIDIR, BILITOT, ALKPHOS, GGT, 5NUCNo results found for: CHOL, HDL, TRIG  TSH: No results found for: TSH  UA: No results found for: NITRITE, COLORU, PHUR, LABCAST, WBCUA, RBCUA, MUCUS, TRICHOMONAS, YEAST, BACTERIA, CLARITYU, SPECGRAV, LEUKOCYTESUR, UROBILINOGEN, BILIRUBINUR, BLOODU, GLUCOSEU, AMORPHOUS      Physical Exam:  Vitals:    08/26/22 0800   BP: (!) 142/68   Pulse: 50   Resp: 16   Temp: 97.8 °F (36.6 °C)   SpO2: 97%    No intake or output data in the 24 hours ending 08/26/22 0824   General:  Mildly anxious  Neck: Supple, no JVD, no carotid bruits  Heart: Regular rhythm normal S1 and S2, no rubs, murmurs or gallops  Lungs: clear to ascultation no rales, wheezes, or rhonchi  Abdomen: positive bowel sounds, soft, non-tender, non-distended, no bruits, no masses  Extremities:no clubbing, cyanosis or edema  Neurologic: alert and oriented x 3, cranial nerves 2-12 grossly intact, motor and sensory intact, moving all extremities  Skin: No rashes  Psych: AO x 3, no depression/william, no pressured speech, normal affect  Lymph: No obvious LAD    HEART SCORE=6    Assessment:  NSTEMI (non-ST elevated myocardial infarction) ([facility] [address]  Anxiety/depression  GERD    Plan:  NSTEMI-history notable for typical chest pain, rise and fall of troponin, reported nonspecific ECG changes  - Continue medical management  ASA and Lipitor  - continue telemetry  - Consider nitro drip and repeat ECG if chest pain returns or worsens  -Continue heparin drip and plan for [facility] today  - B blocker not given d/t bradycardia  2. GERD - continue medical management  3. Anxiety/depression-continue home medications    Thank you for allowing us to participate in the care of this patient. Please do not hesitate to call us with questions. Electronically signed by Jordon Coppola DO on 8/26/2022 at 8:24 AM    Interventional Cardiology - [facility] [address]     I performed the subjective and objective examination of the patient and discussed management with the resident/CNP. I reviewed the resident/CNPs note. Any areas of disagreement were adjusted in the chart. I have personally evaluated this patient and have completed at least one if not all key elements of the E/M (history, physical exam, and MDM). Additional findings are as noted. I agree with the chief complaint, past medical history, past surgical history, allergies, medications, social and family history as documented unless otherwise noted below. A decision was made to proceed with cardiac catheterization with possible PCI as it it the recommended procedure for the patient. The indication, risks and benefits of the procedure and possible therapeutic consequences and alternatives were discussed with the patient. The patient was given the opportunity to ask questions and to have them answered to his/her satisfaction.  Risks of the procedure include but are not limited to the following: Bleeding, hematoma including retroperitoneal hematoma, infection, pain and discomfort, injury to the aorta and other blood vessels, rhythm disturbance, low blood pressure, myocardial infarction, need for bypass surgery, stroke, kidney damage/failure, myocardial perforation, allergic reactions to sedatives/contrast material, loss of pulse/vascular compromise requiring surgery, aneurysm/pseudoaneurysm formation, possible loss of a limb/hand/leg, death. Alternatives to and omission of the suggested procedure were discussed. The patient also understands the need for DAPT if PCI is necessary. The patient had no further questions and wished to proceed; the consent form was signed.       Electronically signed by Yolanda Pollard MD on 8/26/2022 at 1:42 PM  Interventional Cardiology - [facility]

## 2022-08-26 NOTE — H&P
(gastroesophageal reflux disease)     History of blood transfusion     Psychiatric problem      SHX:    Social History     Socioeconomic History    Marital status:       Spouse name: Not on file    Number of children: 2    Years of education: Not on file    Highest education level: Not on file   Occupational History    Occupation: light factory   Tobacco Use    Smoking status: Every Day     Packs/day: 0.50     Years: 1.00     Pack years: 0.50     Types: Cigarettes    Smokeless tobacco: Never   Vaping Use    Vaping Use: Never used   Substance and Sexual Activity    Alcohol use: Not Currently     Comment: rare    Drug use: Yes     Types: Marijuana Simmie Demarcus)    Sexual activity: Not on file   Other Topics Concern    Not on file   Social History Narrative    Not on file     Social Determinants of Health     Financial Resource Strain: Not on file   Food Insecurity: Not on file   Transportation Needs: Not on file   Physical Activity: Not on file   Stress: Not on file   Social Connections: Not on file   Intimate Partner Violence: Not on file   Housing Stability: Not on file     FHX:   Family History   Problem Relation Age of Onset    Coronary Art Dis Father     Cancer Brother      Allergies: No Known Allergies  Medications:     sodium chloride      sodium chloride 50 mL/hr at 08/26/22 0522    heparin (PORCINE) Infusion 12 Units/kg/hr (08/26/22 0524)      escitalopram  20 mg Oral Daily    pantoprazole  40 mg Oral QAM AC    sodium chloride flush  5-40 mL IntraVENous 2 times per day    [START ON 8/27/2022] aspirin  81 mg Oral Daily    atorvastatin  80 mg Oral Nightly     ALPRAZolam, 0.5 mg, TID PRN  sodium chloride flush, 5-40 mL, PRN  sodium chloride, , PRN  ondansetron, 4 mg, Q8H PRN   Or  ondansetron, 4 mg, Q6H PRN  acetaminophen, 650 mg, Q6H PRN   Or  acetaminophen, 650 mg, Q6H PRN  polyethylene glycol, 17 g, Daily PRN  perflutren lipid microspheres, 1.5 mL, ONCE PRN  potassium chloride, 40 mEq, PRN   Or  potassium

## 2022-08-27 VITALS
TEMPERATURE: 98 F | OXYGEN SATURATION: 98 % | HEART RATE: 55 BPM | RESPIRATION RATE: 16 BRPM | SYSTOLIC BLOOD PRESSURE: 130 MMHG | WEIGHT: 138.8 LBS | DIASTOLIC BLOOD PRESSURE: 67 MMHG | BODY MASS INDEX: 25.54 KG/M2 | HEIGHT: 62 IN

## 2022-08-27 LAB
ANION GAP SERPL CALCULATED.3IONS-SCNC: 12 MEQ/L (ref 8–16)
BUN BLDV-MCNC: 9 MG/DL (ref 7–22)
CALCIUM SERPL-MCNC: 8.8 MG/DL (ref 8.5–10.5)
CHLORIDE BLD-SCNC: 107 MEQ/L (ref 98–111)
CO2: 22 MEQ/L (ref 23–33)
CREAT SERPL-MCNC: 0.7 MG/DL (ref 0.4–1.2)
EKG ATRIAL RATE: 60 BPM
EKG P AXIS: 47 DEGREES
EKG P-R INTERVAL: 160 MS
EKG Q-T INTERVAL: 464 MS
EKG QRS DURATION: 96 MS
EKG QTC CALCULATION (BAZETT): 464 MS
EKG R AXIS: -82 DEGREES
EKG T AXIS: -84 DEGREES
EKG VENTRICULAR RATE: 60 BPM
ERYTHROCYTE [DISTWIDTH] IN BLOOD BY AUTOMATED COUNT: 12.8 % (ref 11.5–14.5)
ERYTHROCYTE [DISTWIDTH] IN BLOOD BY AUTOMATED COUNT: 44.2 FL (ref 35–45)
GFR SERPL CREATININE-BSD FRML MDRD: 85 ML/MIN/1.73M2
GLUCOSE BLD-MCNC: 69 MG/DL (ref 70–108)
HCT VFR BLD CALC: 36.4 % (ref 37–47)
HEMOGLOBIN: 12.1 GM/DL (ref 12–16)
MCH RBC QN AUTO: 31.5 PG (ref 26–33)
MCHC RBC AUTO-ENTMCNC: 33.2 GM/DL (ref 32.2–35.5)
MCV RBC AUTO: 94.8 FL (ref 81–99)
PLATELET # BLD: 237 THOU/MM3 (ref 130–400)
PMV BLD AUTO: 11 FL (ref 9.4–12.4)
POTASSIUM REFLEX MAGNESIUM: 3.8 MEQ/L (ref 3.5–5.2)
RBC # BLD: 3.84 MILL/MM3 (ref 4.2–5.4)
SODIUM BLD-SCNC: 141 MEQ/L (ref 135–145)
WBC # BLD: 7.8 THOU/MM3 (ref 4.8–10.8)

## 2022-08-27 PROCEDURE — 6370000000 HC RX 637 (ALT 250 FOR IP): Performed by: PHYSICIAN ASSISTANT

## 2022-08-27 PROCEDURE — 36415 COLL VENOUS BLD VENIPUNCTURE: CPT

## 2022-08-27 PROCEDURE — 99232 SBSQ HOSP IP/OBS MODERATE 35: CPT | Performed by: NURSE PRACTITIONER

## 2022-08-27 PROCEDURE — 85027 COMPLETE CBC AUTOMATED: CPT

## 2022-08-27 PROCEDURE — 93010 ELECTROCARDIOGRAM REPORT: CPT | Performed by: INTERNAL MEDICINE

## 2022-08-27 PROCEDURE — 99238 HOSP IP/OBS DSCHRG MGMT 30/<: CPT | Performed by: HOSPITALIST

## 2022-08-27 PROCEDURE — 80048 BASIC METABOLIC PNL TOTAL CA: CPT

## 2022-08-27 PROCEDURE — 93005 ELECTROCARDIOGRAM TRACING: CPT | Performed by: PHYSICIAN ASSISTANT

## 2022-08-27 PROCEDURE — 6370000000 HC RX 637 (ALT 250 FOR IP): Performed by: INTERNAL MEDICINE

## 2022-08-27 RX ORDER — ASPIRIN 81 MG/1
81 TABLET, CHEWABLE ORAL DAILY
Qty: 30 TABLET | Refills: 3 | Status: SHIPPED | OUTPATIENT
Start: 2022-08-28

## 2022-08-27 RX ADMIN — ESCITALOPRAM 20 MG: 20 TABLET, FILM COATED ORAL at 08:46

## 2022-08-27 RX ADMIN — ALPRAZOLAM 0.5 MG: 0.5 TABLET ORAL at 06:32

## 2022-08-27 RX ADMIN — PANTOPRAZOLE SODIUM 40 MG: 40 TABLET, DELAYED RELEASE ORAL at 06:30

## 2022-08-27 RX ADMIN — ASPIRIN 81 MG: 81 TABLET, CHEWABLE ORAL at 08:46

## 2022-08-27 RX ADMIN — ACETAMINOPHEN 650 MG: 325 TABLET ORAL at 06:26

## 2022-08-27 ASSESSMENT — PAIN DESCRIPTION - DESCRIPTORS: DESCRIPTORS: ACHING

## 2022-08-27 ASSESSMENT — PAIN - FUNCTIONAL ASSESSMENT: PAIN_FUNCTIONAL_ASSESSMENT: ACTIVITIES ARE NOT PREVENTED

## 2022-08-27 ASSESSMENT — PAIN SCALES - GENERAL: PAINLEVEL_OUTOF10: 3

## 2022-08-27 ASSESSMENT — PAIN DESCRIPTION - LOCATION: LOCATION: HEAD

## 2022-08-27 ASSESSMENT — PAIN DESCRIPTION - ORIENTATION: ORIENTATION: ANTERIOR

## 2022-08-27 NOTE — PROGRESS NOTES
Cardiology Progress Note      Patient:  Saint Apo  YOB: 1960  MRN: [id number]   Acct: [de-identified]  Admit Date:  8/25/2022  Primary Cardiologist: none  Seen by dr. Alesia Chan     Per prior cardiology consult note-  CHIEF COMPLAINT: Chest pain/difficulty breathing  Reason for consult: NSTEMI        HPI: This is a pleasant 58 y.o. female with a past medical history of anxiety/depression, GERD, current smoker who presented to [facility] Wednesday night with complaints of chest pressure and difficulty breathing and transferred to [facility] last night for cardiology consult and possible intervention for NSTEMI. The patient reports that Wednesday after work she was feeling \"tired and flustered\" when she got home and began feeling really rapid breathing and a pressure in the center of her chest without radiation, rated 1-2/10. She says the chest pressure lasted about 30 mins and went away on its own and she hasn't experienced it since then. They gave her nitro in the ED but the chest pressure was already gone. The rapid breathing resolved within an hour of arriving to [facility] ED. She endorses  nausea, HA, dizziness, diaphoresis and tenseness in her jawline as well as arm heaviness associated with the chest pain. She said her daughter helped calm her down. She says there's a lot going on right now in her life: her  passed away last year and her brother has cancer. She  has been smoking a lot lately. She says right now she just has a slight headache but denies chest pain, SOB, leg swelling, n/v. She says her father has a history of heart disease requiring bypass surgery and CVA. Per ED note pain has resolved but at [facility] ED Tropinin went from 0.09 to 1.070 and she had EKG changes showing sinus bradycardia, incomplete RBBB, t-wave inversion in leads 2,3 and AVS and V4-V6. Labs creat 0.7, Hgb 12, all other labs within normal limits. Patient was given ASA, Lovenox, last dose at 1000 08/25/22, 60 mg. Vitals 97.7 60 18 123/66 97% on RA. Troponin on arrival to [facility] 0.029, repeat today <0.010. ECG since admission showedd peaked T waves in anterior leads and T wave inversions in II, III and aVF. Heart score 7.        Subjective (Events in last 24 hours):   Pt in bed   No chest pain or SOB today   Sp cath yesterday    RT radial cath site - no ecchymosis or hematoma - Pulses present - neurovascular check WNL     Pt states her brother is ill with cancer and needs help - she has been dealing with that and was stressed so she smoked a lot and drove around town then her chest and breathing got bad       Objective:   /67   Pulse 55   Temp 98 °F (36.7 °C) (Oral)   Resp 16   Ht 5' 2\" (1.575 m)   Wt 138 lb 12.8 oz (63 kg)   SpO2 98%   BMI 25.39 kg/m²        TELEMETRY: SR no ectopy     Physical Exam:  General Appearance: alert and oriented to person, place and time, in no acute distress  Cardiovascular: normal rate, regular rhythm, normal S1 and S2, no murmurs, rubs, clicks, or gallops, distal pulses intact,  Pulmonary/Chest: clear to auscultation bilaterally- no wheezes, rales or rhonchi, normal air movement, no respiratory distress  Abdomen: soft, non-tender, non-distended, normal bowel sounds, no masses Extremities: no cyanosis, clubbing or edema, pulses present    Skin: warm and dry, no rash or erythema  Musculoskeletal: normal range of motion, no joint swelling, deformity or tenderness  Neurological: alert, oriented, normal speech, no focal findings or movement disorder noted    Medications:    escitalopram  20 mg Oral Daily    pantoprazole  40 mg Oral QAM AC    aspirin  81 mg Oral Daily    atorvastatin  80 mg Oral Nightly    sodium chloride flush  5-40 mL IntraVENous 2 times per day      sodium chloride      sodium chloride Stopped (08/26/22 1535)    sodium chloride      sodium chloride Stopped (08/26/22 2124)    sodium chloride       ALPRAZolam, 0.5 mg, TID PRN  sodium chloride, , PRN  ondansetron, 4 mg, Q8H PRN   Or  ondansetron, 4 mg, Q6H PRN  acetaminophen, 650 mg, Q6H PRN  polyethylene glycol, 17 g, Daily PRN  perflutren lipid microspheres, 1.5 mL, ONCE PRN  potassium chloride, 40 mEq, PRN   Or  potassium alternative oral replacement, 40 mEq, PRN   Or  potassium chloride, 10 mEq, PRN  magnesium sulfate, 2,000 mg, PRN  morphine, 2 mg, Q4H PRN   Or  morphine, 4 mg, Q4H PRN  sodium chloride, , PRN  sodium chloride flush, 5-40 mL, PRN  sodium chloride, , PRN  acetaminophen, 650 mg, Q4H PRN  hydrALAZINE, 10 mg, Q6H PRN        Diagnostics:    Echo:    Electronically signed by Mallorie Persaud MD (Interpreting   physician) on 08/26/2022 at 12:23 PM   ----------------------------------------------------------------      Findings      Mitral Valve   The mitral valve structure was normal with normal leaflet separation. DOPPLER: The transmitral velocity was within the normal range with no   evidence for mitral stenosis. There was evidence of Trace mitral   regurgitation. Aortic Valve   The aortic valve was trileaflet with normal thickness and cuspal   separation. DOPPLER: Transaortic velocity was within the normal range with   no evidence of aortic stenosis. There was no evidence of aortic   regurgitation. Tricuspid Valve   The tricuspid valve structure was normal with normal leaflet separation. DOPPLER: There was no evidence of tricuspid stenosis. There was evidence   of Trace tricuspid regurgitation. Pulmonic Valve   The pulmonic valve leaflets exhibited normal thickness, no calcification,   and normal cuspal separation. DOPPLER: The transpulmonic velocity was   within the normal range with no evidence for regurgitation. Left Atrium   Left atrial size was normal.      Left Ventricle   Normal left ventricle size and systolic function. Ejection fraction was   estimated at 55-60%. There were no regional left ventricular wall motion   abnormalities and wall thickness was within normal limits.       Right CKTOTAL, CKMB, CKMBINDEX, TROPONINI in the last 72 hours. CBC:   Lab Results   Component Value Date/Time    WBC 7.8 08/27/2022 03:45 AM    RBC 3.84 08/27/2022 03:45 AM    HGB 12.1 08/27/2022 03:45 AM    HCT 36.4 08/27/2022 03:45 AM     08/27/2022 03:45 AM       CMP:    Lab Results   Component Value Date/Time     08/27/2022 03:45 AM    K 3.8 08/27/2022 03:45 AM     08/27/2022 03:45 AM    CO2 22 08/27/2022 03:45 AM    BUN 9 08/27/2022 03:45 AM    CREATININE 0.7 08/27/2022 03:45 AM    LABGLOM 85 08/27/2022 03:45 AM    GLUCOSE 69 08/27/2022 03:45 AM    CALCIUM 8.8 08/27/2022 03:45 AM       Hepatic Function Panel:  No results found for: ALKPHOS, ALT, AST, PROT, BILITOT, BILIDIR, IBILI, LABALBU    Magnesium:  No results found for: MG    PT/INR:    Lab Results   Component Value Date/Time    INR 1.05 08/26/2022 02:18 AM       HgBA1c:  No results found for: LABA1C    FLP:    Lab Results   Component Value Date/Time    TRIG 63 08/26/2022 06:44 AM    HDL 56 08/26/2022 06:44 AM    LDLCALC 59 08/26/2022 06:44 AM       TSH:  No results found for: TSH      Assessment:    Chest pressure and GARCIA     Anxiety attack - resolved    Pt has been under a lot stress lately (ill family)    Mild elevated trop - ?  Etiology    Here non-significant elevation       Sp cardiac cath 8/26/2022: patent coronaries     Tobacco abuse - cessation discussed   Hx GERD      Plan:  Patent coronary arteries   ?etiology of [facility] elevated trop   Ok to DC from cardiology          Electronically signed by TISH Phillips CNP on 8/27/2022 at 9:08 AM

## 2022-08-27 NOTE — FLOWSHEET NOTE
1930 - removed 2mL of air from TR band. Daughter at bedside visiting with patient. Requesting some coffee due to headache. She has already received tylenol for the headache. 2000 - removed 2mL of air from TR band. Patient tolerated well. Denies any needs at this time. Call light within reach. 2030 - Assessment completed at this time as charted. Notified [employer]	[place] [facility] of increase BP, patient sitting in bed talking with this nurse. Did not move arm and /98. Denies chest pain or discomfort and no anxiety. Removed 2mL of air from TR band. 2100 - return to patient room to administer Hydralazine 10mg IVP but BP is 160/73. Holding hydralazine at this time. Patient watching television and using phone. Released 2mL of air from TR band. Patient tolerating TR band without pain or discomfort. 2145 - Discontinue 0.9NS fluids at this time. Saline lock IV in left wrist. Patient mood improved at this time. States still has no headache or chest pain. 2205 - removed 2mL of air from TR band and then removed TR band at this time. Placed gauze and tegaderm over insertion site.

## 2022-08-27 NOTE — DISCHARGE SUMMARY
Discharge Summary    Patient:  Matthew Razo  YOB: 1960    MRN: [id number]   Acct: [de-identified]    Primary Care Physician: Divine Julien date:  8/25/2022    Discharge date:  8/27/2022       Discharge Diagnoses:   Chest pain  Principal Problem:   Chest pain  Active Problems:    Elevated troponin  Resolved Problems:    * No resolved hospital problems. *      Admitted for: (HPI)  Patient transferred from [facility] inpatient unit for further evaluation of NSTEMI. The patient had presented there with chest pressure and difficulty breathing that she thought was an anxiety attack. The patient states she has had a lot of stress at home and she had gotten off work and avoided returning to her residence for a little while. Then in preparation she chain smoked 4 cigarettes (which is not her normal routine) and went home. She states \"everything blew up\" and she developed chest pressure and could not slow down her breathing. She presented to the ED and had an elevated troponin and EKG changes. The patient was admitted to the inpatient unit at [facility] and her troponin continued to rise and her EKG changes progressed. Patient was transferred for cardiology consult and possible intervention    Hospital Course:  55-year-old female with history of depression and GERD was transferred from [facility] for complaints of chest pain and elevated troponin. Patient admitted for an NSTEMI, she was started on heparin drip. She was seen by cardiology and taken for left heart cath. She had normal coronaries and no intervention was required. Patient's chest pain had completely resolved and had no further episodes. She is being discharged home today with outpatient follow-up with PCP. Assessment/Plan:     Chest pain: Noncardiac, left heart cath negative for obstructive disease. No intervention done. 2D echo reviewed shows normal EF and LV wall motion.     Depression: Continue with Lexapro  GERD: Continue Protonix 40 mg daily       Consultants:  Patient Care Team:  Tenisha larsen PCP - General (Family Medicine)    Discharge Medications:       Medication List        START taking these medications      aspirin 81 MG chewable tablet  Take 1 tablet by mouth daily  Start taking on: August 28, 2022            CONTINUE taking these medications      ALPRAZolam 0.5 MG tablet  Commonly known as: XANAX     Lexapro 20 MG tablet  Generic drug: escitalopram     pantoprazole 20 MG tablet  Commonly known as: PROTONIX               Where to Get Your Medications        These medications were sent to [facility]/pharmacy #[id number]- [place], [address] [address]      Phone: [phone]   aspirin 81 MG chewable tablet           Physical Exam:    Vitals:  Vitals:    08/26/22 2245 08/27/22 0005 08/27/22 0402 08/27/22 0841   BP: (!) 147/73 128/80 127/66 130/67   Pulse: 51 50 62 55   Resp: 16 16 18 16   Temp:  97.9 °F (36.6 °C) 98.4 °F (36.9 °C) 98 °F (36.7 °C)   TempSrc:  Oral Oral Oral   SpO2: 100% 100% 98% 98%   Weight:       Height:         Weight: Weight: 138 lb 12.8 oz (63 kg)     24 hour intake/output:  Intake/Output Summary (Last 24 hours) at 8/27/2022 1305  Last data filed at 8/27/2022 7685  Gross per 24 hour   Intake 1177.63 ml   Output --   Net 1177.63 ml       General appearance - alert awake appears to be in no acute distress  Chest - Bilateral air entry, no wheeze  Heart - S1S2 RRR, no murmurs or gallops  Abdomen - soft, non tender, normoactive bowel sounds  Neurological - non focal  Extremities - no edema  Skin - no rashes or lesions    Procedures:  Left heart cath    Diagnostic Test:  na    Radiology reports as per the Radiologist  Radiology:  Echocardiogram 2D/ M-Mode/ Colorflow/ Do    Result Date: 8/26/2022  Transthoracic Echocardiography Report (TTE)  Demographics   Patient Name    [facility] Gender               Female   MR # [id number]     Race                 Other                                 Ethnicity             or    Account #       [de-identified]     Room Number          0036   Accession       [id number]    Date of Study        08/26/2022  Number   Date of Birth   1960    Referring Physician  Hung Nevarez PA-C   Age             58 year(s)    Tulio Gotti MD                                Physician  Procedure Type of Study   TTE procedure:ECHOCARDIOGRAM COMPLETE 2D W DOPPLER W COLOR. Procedure Date Date: 08/26/2022 Start: 08:24 AM Study Location: Bedside Technical Quality: Adequate visualization Indications:NSTEMI. Additional Medical History:Smoker, GERD Patient Status: Routine Height: 62.2 inches Weight: 138.89 pounds BSA: 1.64 m^2 BMI: 25.24 kg/m^2 BP: 129/72 mmHg  Conclusions   Summary  Normal left ventricle size and systolic function. Ejection fraction was  estimated at 55-60%. There were no regional left ventricular wall motion  abnormalities and wall thickness was within normal limits. Signature   ----------------------------------------------------------------  Electronically signed by Neeru Terrazas MD (Interpreting  physician) on 08/26/2022 at 12:23 PM  ----------------------------------------------------------------   Findings   Mitral Valve  The mitral valve structure was normal with normal leaflet separation. DOPPLER: The transmitral velocity was within the normal range with no  evidence for mitral stenosis. There was evidence of Trace mitral  regurgitation. Aortic Valve  The aortic valve was trileaflet with normal thickness and cuspal  separation. DOPPLER: Transaortic velocity was within the normal range with  no evidence of aortic stenosis.  There was no evidence of aortic regurgitation. Tricuspid Valve  The tricuspid valve structure was normal with normal leaflet separation. DOPPLER: There was no evidence of tricuspid stenosis. There was evidence  of Trace tricuspid regurgitation. Pulmonic Valve  The pulmonic valve leaflets exhibited normal thickness, no calcification,  and normal cuspal separation. DOPPLER: The transpulmonic velocity was  within the normal range with no evidence for regurgitation. Left Atrium  Left atrial size was normal.   Left Ventricle  Normal left ventricle size and systolic function. Ejection fraction was  estimated at 55-60%. There were no regional left ventricular wall motion  abnormalities and wall thickness was within normal limits. Right Atrium  Right atrial size was normal.   Right Ventricle  The right ventricular size was normal with normal systolic function and  wall thickness. Pericardial Effusion  The pericardium was normal in appearance with no evidence of a pericardial  effusion. Pleural Effusion  No evidence of pleural effusion. Aorta / Great Vessels  -Aortic root dimension within normal limits.  -The Pulmonary artery is within normal limits. -IVC size is within normal limits with normal respiratory phasic changes.   M-Mode/2D Measurements & Calculations   LV Diastolic   LV Systolic Dimension:    AV Cusp Separation: 1.9 cmLA  Dimension: 4.7 3.4 cm                    Dimension: 3.4 cmAO Root  cm             LV Volume Diastolic: 75.3 Dimension: 2.7 cmLA Area: 14.1  LV FS:27.7 %   ml                        cm^2  LV PW          LV Volume Systolic: 59.7  Diastolic: 0.9 ml  cm             LV EDV/LV EDV Index: 74.8  Septum         ml/46 m^2LV ESV/LV ESV    RV Diastolic Dimension: 2.5 cm  Diastolic: 0.9 Index: 11.4 ml/22 m^2  cm             EF Calculated: 52.8 %     LA/Aorta: 1.26                                           Ascending Aorta: 2.9 cm                 LV Length: 7.5 cm         LA volume/Index: 32.1 ml /20m^2   LV Area  Diastolic: 26  cm^2  LV Area  Systolic: 17.7  cm^2  Doppler Measurements & Calculations   MV Peak E-Wave: 72.8 AV Peak Velocity: 126 LVOT Peak Velocity: 99.9 cm/s  cm/s                 cm/s                  LVOT Mean Velocity: 66.5 cm/s  MV Peak A-Wave: 58.7 AV Peak Gradient:     LVOT Peak Gradient: 4 mmHgLVOT  cm/s                 6.35 mmHg             Mean Gradient: 2 mmHg  MV E/A Ratio: 1.24   AV Mean Velocity: 90  MV Peak Gradient:    cm/s                  TV Peak E-Wave: 55.7 cm/s  2.12 mmHg            AV Mean Gradient: 4   TV Peak A-Wave: 42.4 cm/s                       mmHg  MV Deceleration      AV VTI: 30 cm         TV Peak Gradient: 1.24 mmHg  Time: 239 msec                             TR Velocity:226 cm/s  MV P1/2t: 70 msec                          TR Gradient:20.43 mmHg  MVA by PHT:3.14 cm^2 LVOT VTI: 22.6 cm     PV Peak Velocity: 71.1 cm/s                       IVRT: 92 msec         PV Peak Gradient: 2.02 mmHg  MV E' Septal  Velocity: 8.8 cm/s  MV A' Septal         AV DVI (VTI): 0.75AV  Velocity: 8.8 cm/s   DVI (Vmax):0.79  MV E' Lateral  Velocity: 8.1 cm/s  MV A' Lateral  Velocity: 10.6 cm/s  E/E' septal: 8.27  E/E' lateral: 8.99  [email or web address] [id number]      Results for orders placed or performed during the hospital encounter of 08/25/22   Troponin   Result Value Ref Range    Troponin T 0.029 (A) ng/ml   Troponin   Result Value Ref Range    Troponin T < 0.010 ng/ml   CBC   Result Value Ref Range    WBC 6.5 4.8 - 10.8 thou/mm3    RBC 3.89 (L) 4.20 - 5.40 mill/mm3    Hemoglobin 12.2 12.0 - 16.0 gm/dl    Hematocrit 37.0 37.0 - 47.0 %    MCV 95.1 81.0 - 99.0 fL    MCH 31.4 26.0 - 33.0 pg    MCHC 33.0 32.2 - 35.5 gm/dl    RDW-CV 13.2 11.5 - 14.5 %    RDW-SD 46.2 (H) 35.0 - 45.0 fL    Platelets 064 335 - 680 thou/mm3    MPV 10.7 9.4 - 12.4 fL   Basic Metabolic Panel w/ Reflex to MG   Result Value Ref Range    Sodium 145 135 - 145 meq/L    Potassium reflex Magnesium 5.1 3.5 - 5.2 meq/L    Chloride 110 98 - 111 meq/L    CO2 22 (L) 23 - 33 meq/L    Glucose 95 70 - 108 mg/dL    BUN 11 7 - 22 mg/dL    Creatinine 0.6 0.4 - 1.2 mg/dL    Calcium 9.4 8.5 - 10.5 mg/dL   Lipid panel - fasting   Result Value Ref Range    Cholesterol, Total 128 100 - 199 mg/dL    Triglycerides 63 0 - 199 mg/dL    HDL 56 mg/dL    LDL Calculated 59 mg/dL   APTT   Result Value Ref Range    aPTT 34.7 22.0 - 38.0 seconds   Protime-INR   Result Value Ref Range    INR 1.05 0.85 - 1.13   APTT   Result Value Ref Range    aPTT 60.1 (H) 22.0 - 38.0 seconds   CBC   Result Value Ref Range    WBC 7.9 4.8 - 10.8 thou/mm3    RBC 3.98 (L) 4.20 - 5.40 mill/mm3    Hemoglobin 12.6 12.0 - 16.0 gm/dl    Hematocrit 37.4 37.0 - 47.0 %    MCV 94.0 81.0 - 99.0 fL    MCH 31.7 26.0 - 33.0 pg    MCHC 33.7 32.2 - 35.5 gm/dl    RDW-CV 13.0 11.5 - 14.5 %    RDW-SD 44.8 35.0 - 45.0 fL    Platelets 708 880 - 012 thou/mm3    MPV 10.6 9.4 - 12.4 fL   Basic Metabolic Panel   Result Value Ref Range    Sodium 143 135 - 145 meq/L    Potassium 3.9 3.5 - 5.2 meq/L    Chloride 109 98 - 111 meq/L    CO2 23 23 - 33 meq/L    Glucose 96 70 - 108 mg/dL    BUN 10 7 - 22 mg/dL    Creatinine 0.6 0.4 - 1.2 mg/dL    Calcium 9.3 8.5 - 10.5 mg/dL   Anion Gap   Result Value Ref Range    Anion Gap 11.0 8.0 - 16.0 meq/L   Glomerular Filtration Rate, Estimated   Result Value Ref Range    Est, Glom Filt Rate >90 ml/min/1.73m2   Anion Gap   Result Value Ref Range    Anion Gap 13.0 8.0 - 16.0 meq/L   Glomerular Filtration Rate, Estimated   Result Value Ref Range    Est, Glojez Filt Rate >90 ml/min/1.73m2   CBC   Result Value Ref Range    WBC 7.8 4.8 - 10.8 thou/mm3    RBC 3.84 (L) 4.20 - 5.40 mill/mm3    Hemoglobin 12.1 12.0 - 16.0 gm/dl    Hematocrit 36.4 (L) 37.0 - 47.0 %    MCV 94.8 81.0 - 99.0 fL    MCH 31.5 26.0 - 33.0 pg    MCHC 33.2 32.2 - 35.5 gm/dl    RDW-CV 12.8 11.5 - 14.5 %    RDW-SD 44.2 35.0 - 45.0 fL    Platelets 903 452 - 047 thou/mm3    MPV 11.0 9.4 - 12.4 fL   Basic Metabolic Panel w/ Reflex to MG   Result Value Ref Range    Sodium 141 135 - 145 meq/L    Potassium reflex Magnesium 3.8 3.5 - 5.2 meq/L    Chloride 107 98 - 111 meq/L    CO2 22 (L) 23 - 33 meq/L    Glucose 69 (L) 70 - 108 mg/dL    BUN 9 7 - 22 mg/dL    Creatinine 0.7 0.4 - 1.2 mg/dL    Calcium 8.8 8.5 - 10.5 mg/dL   Anion Gap   Result Value Ref Range    Anion Gap 12.0 8.0 - 16.0 meq/L   Glomerular Filtration Rate, Estimated   Result Value Ref Range    Est, Glom Filt Rate 85 (A) ml/min/1.73m2   EKG 12 Lead   Result Value Ref Range    Ventricular Rate 58 BPM    Atrial Rate 58 BPM    P-R Interval 158 ms    QRS Duration 94 ms    Q-T Interval 466 ms    QTc Calculation (Bazett) 457 ms    P Axis 49 degrees    R Axis -81 degrees    T Axis -77 degrees   EKG 12 lead   Result Value Ref Range    Ventricular Rate 60 BPM    Atrial Rate 60 BPM    P-R Interval 160 ms    QRS Duration 96 ms    Q-T Interval 464 ms    QTc Calculation (Bazett) 464 ms    P Axis 47 degrees    R Axis -82 degrees    T Axis -84 degrees       Diet:  ADULT DIET;  Regular    Activity:  Activity as tolerated (Patient may move about with assist as indicated or with supervision.)    Follow-up:  in the next few days with Earlene Downing,      Disposition: home    Condition: Stable      Time Spent: 30 minutes    Electronically signed by Chanda Weiss MD on 8/27/2022 at 1:05 PM    Discharging Hospitalist

## 2022-08-27 NOTE — FLOWSHEET NOTE
Discharge instructions to pt using teach back method. Meds reviewed, instructed to f/u with pcp in 1 week. Clarified cardio does not need follow up with the pt. Cardiac cath site care and restrictions reviewed and given. Denies ques or concerns, verbalizes understanding. 1220: Taken to [facility] via wheelchair.

## 2022-09-25 PROBLEM — R77.8 ELEVATED TROPONIN: Status: RESOLVED | Noted: 2022-08-26 | Resolved: 2022-09-25
